# Patient Record
Sex: FEMALE | Race: WHITE | URBAN - METROPOLITAN AREA
[De-identification: names, ages, dates, MRNs, and addresses within clinical notes are randomized per-mention and may not be internally consistent; named-entity substitution may affect disease eponyms.]

---

## 2018-11-30 ENCOUNTER — APPOINTMENT (OUTPATIENT)
Dept: URBAN - METROPOLITAN AREA SURGERY 9 | Age: 48
Setting detail: DERMATOLOGY
End: 2018-11-30

## 2018-11-30 DIAGNOSIS — L21.8 OTHER SEBORRHEIC DERMATITIS: ICD-10-CM

## 2018-11-30 DIAGNOSIS — L82.1 OTHER SEBORRHEIC KERATOSIS: ICD-10-CM

## 2018-11-30 DIAGNOSIS — D22 MELANOCYTIC NEVI: ICD-10-CM

## 2018-11-30 DIAGNOSIS — L44.8 OTHER SPECIFIED PAPULOSQUAMOUS DISORDERS: ICD-10-CM

## 2018-11-30 DIAGNOSIS — L73.8 OTHER SPECIFIED FOLLICULAR DISORDERS: ICD-10-CM

## 2018-11-30 DIAGNOSIS — L81.4 OTHER MELANIN HYPERPIGMENTATION: ICD-10-CM

## 2018-11-30 DIAGNOSIS — B07.8 OTHER VIRAL WARTS: ICD-10-CM

## 2018-11-30 PROBLEM — D22.62 MELANOCYTIC NEVI OF LEFT UPPER LIMB, INCLUDING SHOULDER: Status: ACTIVE | Noted: 2018-11-30

## 2018-11-30 PROBLEM — D22.5 MELANOCYTIC NEVI OF TRUNK: Status: ACTIVE | Noted: 2018-11-30

## 2018-11-30 PROBLEM — L30.9 DERMATITIS, UNSPECIFIED: Status: ACTIVE | Noted: 2018-11-30

## 2018-11-30 PROCEDURE — OTHER DEFER: OTHER

## 2018-11-30 PROCEDURE — OTHER COUNSELING: OTHER

## 2018-11-30 PROCEDURE — OTHER OBSERVATION: OTHER

## 2018-11-30 PROCEDURE — 99203 OFFICE O/P NEW LOW 30 MIN: CPT

## 2018-11-30 PROCEDURE — OTHER TREATMENT REGIMEN: OTHER

## 2018-11-30 PROCEDURE — OTHER PRESCRIPTION: OTHER

## 2018-11-30 PROCEDURE — OTHER REASSURANCE: OTHER

## 2018-11-30 RX ORDER — HYDROCORTISONE 25 MG/G
CREAM TOPICAL
Qty: 1 | Refills: 1 | Status: ERX

## 2018-11-30 ASSESSMENT — LOCATION SIMPLE DESCRIPTION DERM
LOCATION SIMPLE: RIGHT FOREARM
LOCATION SIMPLE: LEFT THIGH
LOCATION SIMPLE: RIGHT UPPER BACK
LOCATION SIMPLE: LEFT FOREHEAD
LOCATION SIMPLE: UPPER BACK
LOCATION SIMPLE: RIGHT SMALL FINGER
LOCATION SIMPLE: LEFT UPPER ARM
LOCATION SIMPLE: RIGHT THIGH
LOCATION SIMPLE: LEFT CHEEK
LOCATION SIMPLE: LEFT FOREARM
LOCATION SIMPLE: RIGHT CHEEK

## 2018-11-30 ASSESSMENT — LOCATION DETAILED DESCRIPTION DERM
LOCATION DETAILED: RIGHT ANTERIOR DISTAL THIGH
LOCATION DETAILED: RIGHT SUPERIOR MEDIAL UPPER BACK
LOCATION DETAILED: INFERIOR THORACIC SPINE
LOCATION DETAILED: RIGHT CENTRAL BUCCAL CHEEK
LOCATION DETAILED: RIGHT DISTAL DORSAL FOREARM
LOCATION DETAILED: LEFT INFERIOR LATERAL FOREHEAD
LOCATION DETAILED: LEFT DISTAL DORSAL FOREARM
LOCATION DETAILED: LEFT INFERIOR CENTRAL MALAR CHEEK
LOCATION DETAILED: LEFT ANTERIOR PROXIMAL THIGH
LOCATION DETAILED: LEFT LATERAL DISTAL UPPER ARM
LOCATION DETAILED: RIGHT PROXIMAL DORSAL SMALL FINGER

## 2018-11-30 ASSESSMENT — LOCATION ZONE DERM
LOCATION ZONE: FACE
LOCATION ZONE: TRUNK
LOCATION ZONE: LEG
LOCATION ZONE: ARM
LOCATION ZONE: FINGER

## 2018-11-30 NOTE — PROCEDURE: TREATMENT REGIMEN
Plan: Can mix with aquaphor if burns when applied by itself\\nCall if worsens
Detail Level: Simple
Initiate Treatment: HC 2.5% cream twice daily as needed
Otc Regimen: Dandruff shampoo for flaking

## 2019-06-28 RX ORDER — HYDROCORTISONE 25 MG/G
CREAM TOPICAL
Qty: 1 | Refills: 1 | Status: ERX

## 2019-08-12 ENCOUNTER — APPOINTMENT (OUTPATIENT)
Dept: URBAN - METROPOLITAN AREA SURGERY 9 | Age: 49
Setting detail: DERMATOLOGY
End: 2019-08-12

## 2019-08-12 DIAGNOSIS — L82.0 INFLAMED SEBORRHEIC KERATOSIS: ICD-10-CM

## 2019-08-12 PROCEDURE — OTHER REASSURANCE: OTHER

## 2019-08-12 PROCEDURE — OTHER OTHER: OTHER

## 2019-08-12 PROCEDURE — 17110 DESTRUCT B9 LESION 1-14: CPT

## 2019-08-12 PROCEDURE — OTHER LIQUID NITROGEN: OTHER

## 2019-08-12 ASSESSMENT — LOCATION ZONE DERM: LOCATION ZONE: LEG

## 2019-08-12 ASSESSMENT — LOCATION DETAILED DESCRIPTION DERM: LOCATION DETAILED: RIGHT DISTAL POSTERIOR THIGH

## 2019-08-12 ASSESSMENT — LOCATION SIMPLE DESCRIPTION DERM: LOCATION SIMPLE: RIGHT POSTERIOR THIGH

## 2019-08-12 NOTE — PROCEDURE: OTHER
Detail Level: Zone
Other (Free Text): If persists after LN2 treatment return for biopsy.
Note Text (......Xxx Chief Complaint.): This diagnosis correlates with the

## 2019-08-12 NOTE — HPI: SKIN LESION
Is This A New Presentation, Or A Follow-Up?: Growth
Additional History: Patient unsure of how long it has been there.

## 2019-08-12 NOTE — PROCEDURE: LIQUID NITROGEN
Render Post-Care Instructions In Note?: no
Duration Of Freeze Thaw-Cycle (Seconds): 15-20
Post-Care Instructions: Reviewed in detail post-care instructions.
Consent: Informed consent obtained including but not limited to risks of pain, blistering, possibly permanent pigmentary change, recurrence and incomplete removal.
Detail Level: Detailed
Medical Necessity Information: It is in your best interest to select a reason for this procedure from the list below. All of these items fulfill various CMS LCD requirements except the new and changing color options.
Medical Necessity Clause: Medical necessity:
Number Of Freeze-Thaw Cycles: 1 freeze-thaw cycle

## 2019-10-16 RX ORDER — HYDROCORTISONE 25 MG/G
CREAM TOPICAL
Qty: 1 | Refills: 0 | Status: ERX

## 2019-10-30 ENCOUNTER — APPOINTMENT (OUTPATIENT)
Dept: URBAN - METROPOLITAN AREA SURGERY 9 | Age: 49
Setting detail: DERMATOLOGY
End: 2019-10-30

## 2019-10-30 DIAGNOSIS — L82.0 INFLAMED SEBORRHEIC KERATOSIS: ICD-10-CM

## 2019-10-30 DIAGNOSIS — D18.0 HEMANGIOMA: ICD-10-CM

## 2019-10-30 PROBLEM — D48.5 NEOPLASM OF UNCERTAIN BEHAVIOR OF SKIN: Status: ACTIVE | Noted: 2019-10-30

## 2019-10-30 PROBLEM — D18.01 HEMANGIOMA OF SKIN AND SUBCUTANEOUS TISSUE: Status: ACTIVE | Noted: 2019-10-30

## 2019-10-30 PROCEDURE — 11102 TANGNTL BX SKIN SINGLE LES: CPT | Mod: 59

## 2019-10-30 PROCEDURE — OTHER LIQUID NITROGEN: OTHER

## 2019-10-30 PROCEDURE — OTHER BIOPSY BY SHAVE METHOD: OTHER

## 2019-10-30 PROCEDURE — OTHER MIPS QUALITY: OTHER

## 2019-10-30 PROCEDURE — OTHER COUNSELING: OTHER

## 2019-10-30 PROCEDURE — OTHER REASSURANCE: OTHER

## 2019-10-30 PROCEDURE — 17110 DESTRUCT B9 LESION 1-14: CPT

## 2019-10-30 PROCEDURE — 99213 OFFICE O/P EST LOW 20 MIN: CPT | Mod: 25

## 2019-10-30 ASSESSMENT — LOCATION ZONE DERM
LOCATION ZONE: TRUNK
LOCATION ZONE: LEG
LOCATION ZONE: EAR

## 2019-10-30 ASSESSMENT — LOCATION SIMPLE DESCRIPTION DERM
LOCATION SIMPLE: LEFT EAR
LOCATION SIMPLE: CHEST
LOCATION SIMPLE: RIGHT POSTERIOR THIGH

## 2019-10-30 ASSESSMENT — LOCATION DETAILED DESCRIPTION DERM
LOCATION DETAILED: RIGHT MEDIAL SUPERIOR CHEST
LOCATION DETAILED: LEFT POSTERIOR EAR
LOCATION DETAILED: RIGHT DISTAL POSTERIOR THIGH

## 2019-10-30 NOTE — HPI: SKIN LESION
Is This A New Presentation, Or A Follow-Up?: Skin Lesion
Additional History: Applying hydrocortisone 2.5% for the last month with improvement.

## 2019-10-30 NOTE — PROCEDURE: LIQUID NITROGEN
Include Z78.9 (Other Specified Conditions Influencing Health Status) As An Associated Diagnosis?: No
Number Of Freeze-Thaw Cycles: 2 freeze-thaw cycles
Medical Necessity Clause: This procedure was medically necessary because the lesions that were treated were:
Post-Care Instructions: I reviewed with the patient in detail post-care instructions. Patient is to wear sunprotection, and avoid picking at any of the treated lesions. Pt may apply Vaseline to crusted or scabbing areas.
Duration Of Freeze Thaw-Cycle (Seconds): 10-15
Detail Level: Simple
Consent: The patient's consent was obtained including but not limited to risks of crusting, scabbing, blistering, scarring, darker or lighter pigmentary change, recurrence, incomplete removal and infection.
Medical Necessity Information: It is in your best interest to select a reason for this procedure from the list below. All of these items fulfill various CMS LCD requirements except the new and changing color options.

## 2019-12-04 ENCOUNTER — APPOINTMENT (OUTPATIENT)
Dept: URBAN - METROPOLITAN AREA SURGERY 9 | Age: 49
Setting detail: DERMATOLOGY
End: 2019-12-04

## 2019-12-04 DIAGNOSIS — D22 MELANOCYTIC NEVI: ICD-10-CM

## 2019-12-04 DIAGNOSIS — L73.8 OTHER SPECIFIED FOLLICULAR DISORDERS: ICD-10-CM

## 2019-12-04 DIAGNOSIS — L81.4 OTHER MELANIN HYPERPIGMENTATION: ICD-10-CM

## 2019-12-04 DIAGNOSIS — L21.8 OTHER SEBORRHEIC DERMATITIS: ICD-10-CM

## 2019-12-04 PROBLEM — D22.5 MELANOCYTIC NEVI OF TRUNK: Status: ACTIVE | Noted: 2019-12-04

## 2019-12-04 PROBLEM — D22.62 MELANOCYTIC NEVI OF LEFT UPPER LIMB, INCLUDING SHOULDER: Status: ACTIVE | Noted: 2019-12-04

## 2019-12-04 PROCEDURE — OTHER COUNSELING: OTHER

## 2019-12-04 PROCEDURE — 99214 OFFICE O/P EST MOD 30 MIN: CPT

## 2019-12-04 PROCEDURE — OTHER OTHER: OTHER

## 2019-12-04 PROCEDURE — OTHER PRESCRIPTION: OTHER

## 2019-12-04 PROCEDURE — OTHER TREATMENT REGIMEN: OTHER

## 2019-12-04 PROCEDURE — OTHER REASSURANCE: OTHER

## 2019-12-04 RX ORDER — FLUOCINONIDE 0.5 MG/ML
SOLUTION TOPICAL
Qty: 1 | Refills: 1 | Status: ERX

## 2019-12-04 ASSESSMENT — LOCATION ZONE DERM
LOCATION ZONE: ARM
LOCATION ZONE: FACE
LOCATION ZONE: SCALP
LOCATION ZONE: TRUNK

## 2019-12-04 ASSESSMENT — LOCATION DETAILED DESCRIPTION DERM
LOCATION DETAILED: LEFT LATERAL DISTAL UPPER ARM
LOCATION DETAILED: LEFT SUPERIOR UPPER BACK
LOCATION DETAILED: RIGHT INFERIOR LATERAL MALAR CHEEK
LOCATION DETAILED: RIGHT SUPERIOR MEDIAL UPPER BACK
LOCATION DETAILED: RIGHT SUPERIOR PARIETAL SCALP
LOCATION DETAILED: LEFT INFERIOR CENTRAL MALAR CHEEK

## 2019-12-04 ASSESSMENT — LOCATION SIMPLE DESCRIPTION DERM
LOCATION SIMPLE: LEFT UPPER ARM
LOCATION SIMPLE: RIGHT UPPER BACK
LOCATION SIMPLE: LEFT UPPER BACK
LOCATION SIMPLE: RIGHT CHEEK
LOCATION SIMPLE: SCALP
LOCATION SIMPLE: LEFT CHEEK

## 2019-12-04 NOTE — PROCEDURE: TREATMENT REGIMEN
Initiate Treatment: Fluocinonide solution
Detail Level: Simple
Otc Regimen: T-Sal Shampoo (ketoconazole shampoo offered, patient declined)

## 2019-12-04 NOTE — PROCEDURE: OTHER
Other (Free Text): Patient states she reacts to “acids” in all anti-aging products. Hydroquinone offered, patient declines. She wishes to consider laser instead. Recommend consultation with ROSEMARY Thomason MD.
Detail Level: Zone
Note Text (......Xxx Chief Complaint.): This diagnosis correlates with the

## 2019-12-05 ENCOUNTER — APPOINTMENT (OUTPATIENT)
Dept: URBAN - METROPOLITAN AREA SURGERY 9 | Age: 49
Setting detail: DERMATOLOGY
End: 2019-12-05

## 2019-12-05 ENCOUNTER — RX ONLY (RX ONLY)
Age: 49
End: 2019-12-05

## 2019-12-05 DIAGNOSIS — Z41.9 ENCOUNTER FOR PROCEDURE FOR PURPOSES OTHER THAN REMEDYING HEALTH STATE, UNSPECIFIED: ICD-10-CM

## 2019-12-05 PROCEDURE — OTHER COSMETIC CONSULTATION: BROWN SPOTS: OTHER

## 2019-12-05 PROCEDURE — OTHER PRODUCT LINE (ANTI-AGING): OTHER

## 2019-12-05 RX ORDER — TRETIONIN 0.25 MG/G
CREAM TOPICAL
Qty: 1 | Refills: 3 | COMMUNITY
Start: 2019-12-05

## 2019-12-05 NOTE — PROCEDURE: PRODUCT LINE (ANTI-AGING)
Product 23 Price (In Dollars - Numeric Only, No Special Characters Or $): 165.00
Product 37 Units: 0
Product 42 Price (In Dollars - Numeric Only, No Special Characters Or $): 0.00
Product 10 Price (In Dollars - Numeric Only, No Special Characters Or $): 58.00
Name Of Product 28: Skin Medica Dermal Repair Cream
Name Of Product 15: Sente Dermal Repair Cream
Product 19 Application Directions: Apply qhs to face; retinoid handout given
Product 10 Application Directions: apply bid
Product 15 Price (In Dollars - Numeric Only, No Special Characters Or $): 150.00
Name Of Product 1: Skin Better Science AlphaRet
Product 28 Price (In Dollars - Numeric Only, No Special Characters Or $): 129.00
Name Of Product 20: Avene Anti-Rogeurs FORT moisturizer
Product 5 Application Directions: qhs to face; retinoid handout given to patient
Name Of Product 11: Obagi 20% vit C serum
Product 15 Application Directions: Apply bid to face
Product 1 Price (In Dollars - Numeric Only, No Special Characters Or $): 110.00
Name Of Product 6: Neocutis Biocream
Product 20 Price (In Dollars - Numeric Only, No Special Characters Or $): 48.00
Product 23 Application Directions: Use bid to moisturize face and neck
Name Of Product 16: Avene Clean-AC moisturizer
Product 1 Application Directions: apply qhs to face; retinoid handout given to patient
Product 20 Application Directions: bid to face
Name Of Product 24: Skin Better Science Alpha-Ret Intense
Product 28 Application Directions: apply bid to face and neck
Name Of Product 2: Obagi Hydrate Luxe face cream
Name Of Product 21: Avene Anti-Brittany FORT
Product 6 Application Directions: apply qd-bid
Product 29 Price (In Dollars - Numeric Only, No Special Characters Or $): 84.00
Name Of Product 29: Tretinoin 0.025%
Product 16 Price (In Dollars - Numeric Only, No Special Characters Or $): 20.00
Product 24 Application Directions: apply to affected areas qhs; retinoid handout given
Product 11 Application Directions: apply daily to face and neck
Product 29 Units: 1
Product 2 Price (In Dollars - Numeric Only, No Special Characters Or $): 72
Name Of Product 7: Avene Tolerance Extreme moisturizer
Product 29 Application Directions: Apply to face every night after moisturizer.
Name Of Product 17: Avene anti-rogeurs Cleansing Milk
Product 7 Price (In Dollars - Numeric Only, No Special Characters Or $): 38
Name Of Product 12: Skin Better Science Eye cream
Name Of Product 25: Tretinoin 0.1% cream
Name Of Product 3: Elta MD SPF 46 clear
Name Of Product 22: Avene Anti-Rogeurs Cleanser Milk
Product 21 Application Directions: use twice a day to moisturize face
Product 25 Price (In Dollars - Numeric Only, No Special Characters Or $): 102.00
Product 12 Price (In Dollars - Numeric Only, No Special Characters Or $): 100.00
Name Of Product 30: Obagi soothing complex
Product 3 Price (In Dollars - Numeric Only, No Special Characters Or $): 30.00
Name Of Product 8: TNS Recovery complex
Product 22 Price (In Dollars - Numeric Only, No Special Characters Or $): 28.00
Product 12 Application Directions: apply bid to eyelids
Product 25 Application Directions: apply qhs to face; Retinoid handout given to patient
Product 30 Price (In Dollars - Numeric Only, No Special Characters Or $): 79.00
Product 8 Price (In Dollars - Numeric Only, No Special Characters Or $): 172.00
Name Of Product 26: Alto Serum
Name Of Product 13: Дмитрий Lang
Product 17 Application Directions: use bid to wash face
Product 13 Price (In Dollars - Numeric Only, No Special Characters Or $): 99.00
Product 26 Price (In Dollars - Numeric Only, No Special Characters Or $): 145.00
Name Of Product 18: Environ C-quence 1
Detail Level: Zone
Product 3 Application Directions: qd
Product 22 Application Directions: use to wash face twice a day
Product 18 Price (In Dollars - Numeric Only, No Special Characters Or $): 116.00
Name Of Product 23: Juan J Lang
Product 4 Price (In Dollars - Numeric Only, No Special Characters Or $): 80.00
Name Of Product 4: Neocutis Lumiere Eye Cream
Allow Plan To Count Towards E/M Coding: Yes
Name Of Product 9: Avene cleansing foam
Product 26 Application Directions: Apply qam
Product 13 Application Directions: apply qd-bid around eyes
Product 27 Price (In Dollars - Numeric Only, No Special Characters Or $): 38.00
Product 14 Price (In Dollars - Numeric Only, No Special Characters Or $): 225.00
Name Of Product 19: Environ C-Count includes the [facility] 2
Name Of Product 14: Neocutis Bioserum
Name Of Product 27: Skin Medica Facial Cleanser
Product 18 Application Directions: Apply bid to face and once a week to neck and chest
Product 19 Price (In Dollars - Numeric Only, No Special Characters Or $): 121.00
Name Of Product 5: Skin Medica Retinol 1%
Product 14 Application Directions: apply qd to face
Product 5 Price (In Dollars - Numeric Only, No Special Characters Or $): 90.00
Name Of Product 10: SkinMedica Rejuvenative moisturizer

## 2020-01-13 ENCOUNTER — APPOINTMENT (OUTPATIENT)
Dept: URBAN - METROPOLITAN AREA SURGERY 9 | Age: 50
Setting detail: DERMATOLOGY
End: 2020-01-13

## 2020-01-13 DIAGNOSIS — L81.4 OTHER MELANIN HYPERPIGMENTATION: ICD-10-CM

## 2020-01-13 PROCEDURE — OTHER LASER BROWN SPOTS: OTHER

## 2020-01-13 PROCEDURE — OTHER PRODUCT LINE (ANTI-AGING): OTHER

## 2020-01-13 PROCEDURE — OTHER OTHER: OTHER

## 2020-01-13 ASSESSMENT — LOCATION ZONE DERM: LOCATION ZONE: FACE

## 2020-01-13 ASSESSMENT — LOCATION SIMPLE DESCRIPTION DERM
LOCATION SIMPLE: RIGHT CHEEK
LOCATION SIMPLE: LEFT CHEEK

## 2020-01-13 ASSESSMENT — LOCATION DETAILED DESCRIPTION DERM
LOCATION DETAILED: LEFT INFERIOR CENTRAL MALAR CHEEK
LOCATION DETAILED: RIGHT INFERIOR CENTRAL MALAR CHEEK

## 2020-01-13 NOTE — PROCEDURE: OTHER
Other (Free Text): Treatment #1\\nCost $500
Note Text (......Xxx Chief Complaint.): This diagnosis correlates with the
Detail Level: Detailed

## 2020-03-09 ENCOUNTER — APPOINTMENT (OUTPATIENT)
Dept: URBAN - METROPOLITAN AREA SURGERY 9 | Age: 50
Setting detail: DERMATOLOGY
End: 2020-03-09

## 2020-03-09 DIAGNOSIS — L81.4 OTHER MELANIN HYPERPIGMENTATION: ICD-10-CM

## 2020-03-09 DIAGNOSIS — L57.8 OTHER SKIN CHANGES DUE TO CHRONIC EXPOSURE TO NONIONIZING RADIATION: ICD-10-CM

## 2020-03-09 PROCEDURE — OTHER PRODUCT LINE (ANTI-AGING): OTHER

## 2020-03-09 PROCEDURE — OTHER OTHER: OTHER

## 2020-03-09 PROCEDURE — OTHER LASER BROWN SPOTS: OTHER

## 2020-03-09 ASSESSMENT — LOCATION ZONE DERM: LOCATION ZONE: FACE

## 2020-03-09 ASSESSMENT — LOCATION SIMPLE DESCRIPTION DERM
LOCATION SIMPLE: RIGHT CHEEK
LOCATION SIMPLE: LEFT CHEEK

## 2020-03-09 NOTE — PROCEDURE: LASER BROWN SPOTS
External Cooling Fan Speed: 5
Post-Care Instructions: I reviewed with the patient in detail post-care instructions. Patient is to apply vaseline with a q-tip to all crusted areas, and avoid picking at any scabs. Pt should stay away from the sun and wear sun protection until fully healed.
Consent: Written consent obtained, risks reviewed including but not limited to crusting, scabbing, blistering, scarring, darker or lighter pigmentary change, and/or incomplete removal.
Post Procedure Text: Vaseline and sunscreen applied. Post care reviewed with patient.
Price (Use Numbers Only, No Special Characters Or $): 350.0
Detail Level: Zone
Spot Size In Mm: 3
Laser Type: Q-switched Nd:Yag 532nm
Fluence: 2

## 2020-03-09 NOTE — PROCEDURE: OTHER
Note Text (......Xxx Chief Complaint.): This diagnosis correlates with the
Detail Level: Detailed
Other (Free Text): Treatment #2\\n\\nCost $500

## 2020-03-09 NOTE — PROCEDURE: PRODUCT LINE (ANTI-AGING)
Product 1 Application Directions: apply qhs to face; retinoid handout given to patient\\nBox- 1-135\\nExp. 07/2021\\nLot# [id number]\\nRefill:1 Product 1 Application Directions: apply qhs to face; retinoid handout given to patient\\nBox- 1-135\\nExp. 07/2021\\nLot# [id number]\\nRefill:1

## 2020-07-20 ENCOUNTER — RX ONLY (RX ONLY)
Age: 50
End: 2020-07-20

## 2020-07-20 ENCOUNTER — APPOINTMENT (OUTPATIENT)
Dept: URBAN - METROPOLITAN AREA SURGERY 9 | Age: 50
Setting detail: DERMATOLOGY
End: 2020-07-20

## 2020-07-20 DIAGNOSIS — L98.8 OTHER SPECIFIED DISORDERS OF THE SKIN AND SUBCUTANEOUS TISSUE: ICD-10-CM

## 2020-07-20 DIAGNOSIS — Z41.9 ENCOUNTER FOR PROCEDURE FOR PURPOSES OTHER THAN REMEDYING HEALTH STATE, UNSPECIFIED: ICD-10-CM

## 2020-07-20 PROCEDURE — OTHER PRODUCT LINE (ANTI-AGING): OTHER

## 2020-07-20 PROCEDURE — OTHER FILLERS: OTHER

## 2020-07-20 PROCEDURE — OTHER COSMETIC CONSULTATION: FILLERS: OTHER

## 2020-07-20 RX ORDER — HYDROCORTISONE 25 MG/G
CREAM TOPICAL
Qty: 1 | Refills: 1 | Status: ERX

## 2020-07-20 NOTE — PROCEDURE: PRODUCT LINE (ANTI-AGING)
Product 64 Price (In Dollars - Numeric Only, No Special Characters Or $): 0.00
Product 26 Application Directions: Apply qam
Product 49 Units: 0
Name Of Product 19: Environ C-[facility] 2
Product 4 Price (In Dollars - Numeric Only, No Special Characters Or $): 80.00
Product 8 Application Directions: apply bid
Name Of Product 27: Skin Medica Facial Cleanser
Product 19 Price (In Dollars - Numeric Only, No Special Characters Or $): 121.00
Send Charges To Patient Encounter: Yes
Name Of Product 9: Avene cleansing foam
Product 14 Application Directions: apply qd to face
Product 27 Price (In Dollars - Numeric Only, No Special Characters Or $): 38.00
Product 4 Application Directions: apply bid to eyelids
Product 9 Price (In Dollars - Numeric Only, No Special Characters Or $): 20.00
Name Of Product 15: Sente Dermal Repair Cream
Name Of Product 5: Skin Medica Retinol 1%
Product 19 Application Directions: Apply qhs to face; retinoid handout given
Product 15 Price (In Dollars - Numeric Only, No Special Characters Or $): 150.00
Product 27 Application Directions: use bid to wash face
Product 5 Price (In Dollars - Numeric Only, No Special Characters Or $): 90.00
Product 9 Application Directions: use to wash face twice a day
Name Of Product 20: Avene Anti-Rogeurs FORT moisturizer
Name Of Product 28: Skin Medica Dermal Repair Cream
Name Of Product 11: Obagi 20% vit C serum
Name Of Product 10: SkinMedica Rejuvenative moisturizer
Product 15 Application Directions: Apply bid to face
Name Of Product 1: Skin Better Science AlphaRet
Product 20 Price (In Dollars - Numeric Only, No Special Characters Or $): 48.00
Product 28 Price (In Dollars - Numeric Only, No Special Characters Or $): 129.00
Product 11 Price (In Dollars - Numeric Only, No Special Characters Or $): 110.00
Product 23 Application Directions: Use bid to moisturize face and neck
Product 10 Price (In Dollars - Numeric Only, No Special Characters Or $): 58.00
Product 5 Application Directions: qhs to face; retinoid handout given to patient
Name Of Product 16: Avene Clean-AC moisturizer
Name Of Product 24: Skin Better Science Alpha-Ret Intense
Product 20 Application Directions: bid to face
Name Of Product 6: Neocutis Biocream
Product 11 Application Directions: apply daily to face and neck
Product 28 Application Directions: apply bid to face and neck
Product 1 Application Directions: apply qhs to face; retinoid handout given to patient
Name Of Product 21: Avene Anti-Brittany FORT
Name Of Product 12: Skin Better Science Eye cream
Name Of Product 29: Obagi Clear Fx vit C
Name Of Product 2: Obagi Soothing Complex
Product 29 Price (In Dollars - Numeric Only, No Special Characters Or $): 103.50
Product 12 Price (In Dollars - Numeric Only, No Special Characters Or $): 100.00
Product 6 Application Directions: apply qd-bid
Product 24 Application Directions: apply to affected areas qhs; retinoid handout given
Name Of Product 17: Avene anti-rogeurs Cleansing Milk
Product 2 Price (In Dollars - Numeric Only, No Special Characters Or $): 79.00
Name Of Product 7: Avene Tolerance Extreme moisturizer
Name Of Product 25: Tretinoin 0.025% cream
Product 2 Units: 1
Product 17 Price (In Dollars - Numeric Only, No Special Characters Or $): 28.00
Product 21 Application Directions: use twice a day to moisturize face
Product 29 Application Directions: Apply qam to face and neck
Product 25 Price (In Dollars - Numeric Only, No Special Characters Or $): 84.00
Name Of Product 22: Avene Anti-Rogeurs Cleanser Milk
Product 7 Price (In Dollars - Numeric Only, No Special Characters Or $): 38
Name Of Product 13: Дмитрий Lang
Name Of Product 3: Elta MD SPF 41 tinted
Product 13 Price (In Dollars - Numeric Only, No Special Characters Or $): 99.00
Product 25 Application Directions: apply qhs to face; Retinoid handout given to patient
Product 3 Price (In Dollars - Numeric Only, No Special Characters Or $): 31.00
Name Of Product 18: Environ C-quence 1
Name Of Product 26: Alto Serum
Name Of Product 8: TNS Recovery complex
Detail Level: Zone
Product 13 Application Directions: apply qd-bid around eyes
Product 18 Price (In Dollars - Numeric Only, No Special Characters Or $): 116.00
Product 26 Price (In Dollars - Numeric Only, No Special Characters Or $): 145.00
Product 8 Price (In Dollars - Numeric Only, No Special Characters Or $): 172.00
Name Of Product 14: Neocutis Bioserum
Product 3 Application Directions: Apply to face qd
Name Of Product 23: Juan J Lang
Name Of Product 4: Neocutis Lumiere Eye Cream
Product 23 Price (In Dollars - Numeric Only, No Special Characters Or $): 165.00
Product 18 Application Directions: Apply bid to face and once a week to neck and chest
Product 14 Price (In Dollars - Numeric Only, No Special Characters Or $): 225.00

## 2021-01-01 NOTE — PROCEDURE: LASER BROWN SPOTS
Price (Use Numbers Only, No Special Characters Or $): 500
Fluence: 2.4
Post-Care Instructions: I reviewed with the patient in detail post-care instructions. Patient is to apply vaseline with a q-tip to all crusted areas, and avoid picking at any scabs. Pt should stay away from the sun and wear sun protection until fully healed.
Laser Type: Q-switched Nd:Yag 532nm
Consent: Written consent obtained, risks reviewed including but not limited to crusting, scabbing, blistering, scarring, darker or lighter pigmentary change, and/or incomplete removal.
External Cooling Fan Speed: 5
Spot Size In Mm: 3
Detail Level: Zone
Post Procedure Text: Vaseline and sunscreen applied. Post care reviewed with patient.
maternal ITP: will check platelets in baby

## 2021-02-10 ENCOUNTER — APPOINTMENT (OUTPATIENT)
Dept: URBAN - METROPOLITAN AREA SURGERY 9 | Age: 51
Setting detail: DERMATOLOGY
End: 2021-02-10

## 2021-02-10 DIAGNOSIS — D22 MELANOCYTIC NEVI: ICD-10-CM

## 2021-02-10 DIAGNOSIS — L73.8 OTHER SPECIFIED FOLLICULAR DISORDERS: ICD-10-CM

## 2021-02-10 DIAGNOSIS — L21.8 OTHER SEBORRHEIC DERMATITIS: ICD-10-CM

## 2021-02-10 DIAGNOSIS — L81.4 OTHER MELANIN HYPERPIGMENTATION: ICD-10-CM

## 2021-02-10 DIAGNOSIS — L57.8 OTHER SKIN CHANGES DUE TO CHRONIC EXPOSURE TO NONIONIZING RADIATION: ICD-10-CM

## 2021-02-10 DIAGNOSIS — R233 SPONTANEOUS ECCHYMOSES: ICD-10-CM

## 2021-02-10 PROBLEM — S70.12XA CONTUSION OF LEFT THIGH, INITIAL ENCOUNTER: Status: ACTIVE | Noted: 2021-02-10

## 2021-02-10 PROBLEM — D22.62 MELANOCYTIC NEVI OF LEFT UPPER LIMB, INCLUDING SHOULDER: Status: ACTIVE | Noted: 2021-02-10

## 2021-02-10 PROBLEM — D22.5 MELANOCYTIC NEVI OF TRUNK: Status: ACTIVE | Noted: 2021-02-10

## 2021-02-10 PROCEDURE — OTHER PRESCRIPTION: OTHER

## 2021-02-10 PROCEDURE — OTHER PRESCRIPTION MEDICATION MANAGEMENT: OTHER

## 2021-02-10 PROCEDURE — OTHER REASSURANCE: OTHER

## 2021-02-10 PROCEDURE — 99213 OFFICE O/P EST LOW 20 MIN: CPT

## 2021-02-10 PROCEDURE — OTHER COUNSELING: OTHER

## 2021-02-10 PROCEDURE — OTHER SUNSCREEN RECOMMENDATIONS: OTHER

## 2021-02-10 RX ORDER — TRETIONIN 0.25 MG/G
CREAM TOPICAL
Qty: 1 | Refills: 5

## 2021-02-10 RX ORDER — HYDROCORTISONE 25 MG/G
CREAM TOPICAL
Qty: 1 | Refills: 2 | COMMUNITY
Start: 2021-02-10

## 2021-02-10 ASSESSMENT — LOCATION SIMPLE DESCRIPTION DERM
LOCATION SIMPLE: CHEST
LOCATION SIMPLE: INFERIOR FOREHEAD
LOCATION SIMPLE: LEFT POSTERIOR THIGH
LOCATION SIMPLE: ABDOMEN
LOCATION SIMPLE: LEFT FOREHEAD
LOCATION SIMPLE: LEFT CHEEK
LOCATION SIMPLE: LEFT UPPER ARM
LOCATION SIMPLE: RIGHT FOREHEAD

## 2021-02-10 ASSESSMENT — LOCATION ZONE DERM
LOCATION ZONE: FACE
LOCATION ZONE: ARM
LOCATION ZONE: LEG
LOCATION ZONE: TRUNK

## 2021-02-10 ASSESSMENT — LOCATION DETAILED DESCRIPTION DERM
LOCATION DETAILED: LEFT LATERAL SUPERIOR CHEST
LOCATION DETAILED: LEFT INFERIOR MEDIAL FOREHEAD
LOCATION DETAILED: RIGHT RIB CAGE
LOCATION DETAILED: LEFT PROXIMAL LATERAL POSTERIOR THIGH
LOCATION DETAILED: LEFT LATERAL DISTAL UPPER ARM
LOCATION DETAILED: RIGHT MEDIAL FOREHEAD
LOCATION DETAILED: LEFT INFERIOR CENTRAL MALAR CHEEK
LOCATION DETAILED: INFERIOR MID FOREHEAD

## 2021-02-10 NOTE — PROCEDURE: PRESCRIPTION MEDICATION MANAGEMENT
Render In Strict Bullet Format?: No
Detail Level: Zone
Continue Regimen: HC 2.5% cream as directed
Continue Regimen: Tretinoin 0.025% cream as directed

## 2021-02-10 NOTE — PROCEDURE: REASSURANCE
Include Location In Plan?: No
Detail Level: Generalized
Detail Level: Simple
Detail Level: Zone
Additional Notes (Optional): Patient admits to falling in her home.

## 2021-02-10 NOTE — PROCEDURE: SUNSCREEN RECOMMENDATIONS
General Sunscreen Counseling: I recommended a broad spectrum sunscreen with a SPF of 30 or higher.  I explained that SPF 30 sunscreens block approximately 97 percent of the sun's harmful rays.  Sunscreens should be applied at least 15 minutes prior to expected sun exposure and then every 2 hours after that as long as sun exposure continues. If swimming or exercising sunscreen should be reapplied every 45 minutes to an hour after getting wet or sweating. I also recommended a lip balm with a sunscreen as well.
Detail Level: Generalized

## 2022-02-23 RX ORDER — BIMATOPROST 0.3 MG/ML
SOLUTION/ DROPS OPHTHALMIC
Qty: 5 | Refills: 3 | Status: ERX | COMMUNITY
Start: 2022-02-23

## 2022-02-24 ENCOUNTER — APPOINTMENT (OUTPATIENT)
Dept: URBAN - METROPOLITAN AREA SURGERY 9 | Age: 52
Setting detail: DERMATOLOGY
End: 2022-02-24

## 2022-02-24 DIAGNOSIS — H02.72 MADAROSIS OF EYELID AND PERIOCULAR AREA: ICD-10-CM

## 2022-02-24 DIAGNOSIS — L57.8 OTHER SKIN CHANGES DUE TO CHRONIC EXPOSURE TO NONIONIZING RADIATION: ICD-10-CM

## 2022-02-24 DIAGNOSIS — L21.8 OTHER SEBORRHEIC DERMATITIS: ICD-10-CM

## 2022-02-24 PROBLEM — H02.721 MADAROSIS OF RIGHT UPPER EYELID AND PERIOCULAR AREA: Status: ACTIVE | Noted: 2022-02-24

## 2022-02-24 PROBLEM — H02.724 MADAROSIS OF LEFT UPPER EYELID AND PERIOCULAR AREA: Status: ACTIVE | Noted: 2022-02-24

## 2022-02-24 PROCEDURE — 99213 OFFICE O/P EST LOW 20 MIN: CPT | Mod: 95

## 2022-02-24 PROCEDURE — OTHER PRESCRIPTION: OTHER

## 2022-02-24 PROCEDURE — OTHER PRESCRIPTION MEDICATION MANAGEMENT: OTHER

## 2022-02-24 PROCEDURE — OTHER COUNSELING: OTHER

## 2022-02-24 RX ORDER — TRETIONIN 0.25 MG/G
CREAM TOPICAL
Qty: 20 | Refills: 11 | Status: ERX

## 2022-02-24 RX ORDER — HYDROCORTISONE 25 MG/G
CREAM TOPICAL
Qty: 30 | Refills: 2 | Status: ERX

## 2022-02-24 ASSESSMENT — LOCATION SIMPLE DESCRIPTION DERM
LOCATION SIMPLE: INFERIOR FOREHEAD
LOCATION SIMPLE: LEFT SUPERIOR EYELID
LOCATION SIMPLE: RIGHT SUPERIOR EYELID
LOCATION SIMPLE: LEFT FOREHEAD

## 2022-02-24 ASSESSMENT — LOCATION ZONE DERM
LOCATION ZONE: EYELID
LOCATION ZONE: FACE

## 2022-02-24 ASSESSMENT — LOCATION DETAILED DESCRIPTION DERM
LOCATION DETAILED: LEFT MEDIAL SUPERIOR EYELID
LOCATION DETAILED: RIGHT MEDIAL SUPERIOR EYELID
LOCATION DETAILED: INFERIOR MID FOREHEAD
LOCATION DETAILED: LEFT INFERIOR MEDIAL FOREHEAD

## 2022-02-24 NOTE — PROCEDURE: PRESCRIPTION MEDICATION MANAGEMENT
Detail Level: Zone
Initiate Treatment: Generic Latisse as prescribed
Render In Strict Bullet Format?: No
Continue Regimen: HC 2.5% cream as directed
Continue Regimen: Tretinoin 0.025% cream as directed